# Patient Record
(demographics unavailable — no encounter records)

---

## 2024-10-22 NOTE — PAST MEDICAL HISTORY
[Menarche Age ____] : age at menarche was [unfilled] [History of Hormone Replacement Treatment] : has no history of hormone replacement treatment [Total Preg ___] : G[unfilled] [Live Births ___] : P[unfilled]

## 2024-10-22 NOTE — PHYSICAL EXAM
[No Supraclavicular Adenopathy] : no supraclavicular adenopathy [No Cervical Adenopathy] : no cervical adenopathy [Clear to Auscultation Bilat] : clear to auscultation bilaterally [Examined in the supine and seated position] : examined in the supine and seated position [Asymmetrical] : asymmetrical [No dominant masses] : no dominant masses in right breast  [No dominant masses] : no dominant masses left breast [No Nipple Retraction] : no left nipple retraction [No Nipple Discharge] : no left nipple discharge [No Axillary Lymphadenopathy] : no left axillary lymphadenopathy [No Rashes] : no rashes [de-identified] : Status post right total mastectomy without reconstruction. [de-identified] : Lymphedema of the right arm 1-2+.

## 2024-10-22 NOTE — HISTORY OF PRESENT ILLNESS
[FreeTextEntry1] : 8/24 right total mastectomy with sentinel node biopsy without reconstruction Moderately differentiated invasive ductal carcinoma, 1.7 cm, negative margins, 0/4 nodes, ER/[place] positive HER2 negative with a Ki-67 between 40 and 50% T1 cN0 M0, stage Ia, [place] EX: Low risk luminal  Patient denies any breast masses or bone pain.  She has been to physical therapy for range of motion exercises and its gotten completely better but in the meantime has developed some right arm swelling and is received no treatment for this.  Patient has not seen medical oncology.  76-year-old postmenopausal nulliparous woman went to her annual visit with the primary care physician who felt a right upper outer quadrant breast mass.  Patient had a mammogram which showed a superficial lobulated mass with architectural distortion and associated calcifications measuring 8 mm in the area of the palpable concern with an additional lobulated mass with calcifications in the upper outer quadrant more posteriorly again 8 mm in dimension.  Extending between these 2 masses there is a large area of pleomorphic calcifications expanding an area of approximately 6 cm.  There is nothing abnormal on the left side.  Ultrasound showed at the 10 o'clock position of the right breast 3 cm from the nipple is a 7 mm irregular shaped superficial hypoechoic mass with internal vascularity as well as in the 10 o'clock position, 4 cm from the nipple another 7 mm lobulated hypoechoic mass deeply situated, BI-RADS 4.  No axillary adenopathy was identified, and the left breast was benign.  She denies any nipple discharge or bone pain.  Patient is adopted and does not know her family history and this is her first set of biopsies.  Patient has never taken hormone replacement therapy.

## 2024-10-29 NOTE — ASSESSMENT
[Reviewed updated] : Reviewed updated [Designated Health Care Proxy] : Designated Health Care Proxy [Name: ___] : Name: [unfilled] [Relationship: ___] : Relationship: [unfilled] [Full Code] : full code [FreeTextEntry1] : # Moderately differentiated invasive ductal carcinoma, 1.7 cm, negative margins, 0/4 nodes, ER/[place] positive HER2 negative with a Ki-67 between 40 and 50% T1 cN0 M0, stage Ia, [place] EX: Low risk luminal A s/p right total mastectomy with sentinel node biopsy without reconstruction Reviewed the diagnosis, prognosis and natural history of ER/[place] positive HER2 negative IDC Reviewed the imaging and path Reviewed the NCCN guidelines and patient expresses understanding We did discuss despite her mammaprint I would recommend an aromatase inhibitor, Anastrozole, 1mg PO q day for a minimum of 5 years given that her tumor is ER/KS+ and she is post menopausal. We have reviewed the side effects of Anastrozole to include but are not limited to hot flashes, mood swings, arthralgias, bone pain, thinning of the bones including osteopenia/osteoporosis. She will take this with Ca++ 1200 mg PO q day and Vit D 800 IU daily to protect her bone health.      #osteopenia continue ca++ and vit D continue weight bearing exercise limit alcohol maintain healthy BMI    DEXA 6/24 -  Osteopenia,  Spine, L1 and L2 excluded: T-score: 0.1.  Femoral neck:  T-score: -1.7 . Total hip: T-score: -1.0  #lymphedema - PT ordered  RTC in 3-4 months with cbc with diff, cmp, vit D [AdvancecareDate] : 10/25/24

## 2024-10-29 NOTE — HISTORY OF PRESENT ILLNESS
[de-identified] : Ms. Yesy Cantu is a 76 year old female referred to office by Dr. Brito here for initial consultation for breast cancer. 07/24/24: Mammaprint result low risk 08/01/24 right total mastectomy with sentinel node biopsy without reconstruction Moderately differentiated invasive ductal carcinoma, 1.7 cm, negative margins, 0/4 nodes, ER/[place] positive HER2 negative with a Ki-67 between 40 and 50% T1 cN0 M0, stage Ia, [place] EX: Low risk luminal Patient had a mammogram which showed a superficial lobulated mass with architectural distortion and associated calcifications measuring 8 mm in the area of the palpable concern with an additional lobulated mass with calcifications in the upper outer quadrant more posteriorly again 8 mm in dimension. Extending between these 2 masses there is a large area of pleomorphic calcifications expanding an area of approximately 6 cm. There is nothing abnormal on the left side. Ultrasound showed at the 10 o'clock position of the right breast 3 cm from the nipple is a 7 mm irregular shaped superficial hypoechoic mass with internal vascularity as well as in the 10 o'clock position, 4 cm from the nipple another 7 mm lobulated hypoechoic mass deeply situated, BI-RADS 4. No axillary adenopathy was identified, and the left breast was benign. She denies any nipple discharge or bone pain. Patient is adopted and does not know her family history and this is her first set of biopsies. Patient has never taken hormone replacement therapy.  med hx: HTN, anxiety  Related family history: adopted   Menarche: 13 years, heavy, 2-3 ppd Menopause: 35 G0 Oral Contraceptives: birth control in her 20s for a couple of years Last pap/pelvic exam: 10 years ago Occupation: retired IT supervisor  Smoking: never ETOH: occasionally Illicit drug use: no

## 2024-10-29 NOTE — ASSESSMENT
[Reviewed updated] : Reviewed updated [Designated Health Care Proxy] : Designated Health Care Proxy [Name: ___] : Name: [unfilled] [Relationship: ___] : Relationship: [unfilled] [Full Code] : full code [FreeTextEntry1] : # Moderately differentiated invasive ductal carcinoma, 1.7 cm, negative margins, 0/4 nodes, ER/CA positive HER2 negative with a Ki-67 between 40 and 50% T1 cN0 M0, stage Ia, [place] EX: Low risk luminal A s/p right total mastectomy with sentinel node biopsy without reconstruction Reviewed the diagnosis, prognosis and natural history of ER/CA positive HER2 negative IDC Reviewed the imaging and path Reviewed the NCCN guidelines and patient expresses understanding We did discuss despite her mammaprint I would recommend an aromatase inhibitor, Anastrozole, 1mg PO q day for a minimum of 5 years given that her tumor is ER/CA+ and she is post menopausal. We have reviewed the side effects of Anastrozole to include but are not limited to hot flashes, mood swings, arthralgias, bone pain, thinning of the bones including osteopenia/osteoporosis. She will take this with Ca++ 1200 mg PO q day and Vit D 800 IU daily to protect her bone health.      #osteopenia continue ca++ and vit D continue weight bearing exercise limit alcohol maintain healthy BMI    DEXA 6/24 -  Osteopenia,  Spine, L1 and L2 excluded: T-score: 0.1.  Femoral neck:  T-score: -1.7 . Total hip: T-score: -1.0  #lymphedema - PT ordered  RTC in 3-4 months with cbc with diff, cmp, vit D [AdvancecareDate] : 10/25/24

## 2024-10-29 NOTE — HISTORY OF PRESENT ILLNESS
[de-identified] : Ms. Yesy Cantu is a 76 year old female referred to office by Dr. Brito here for initial consultation for breast cancer. 07/24/24: Mammaprint result low risk 08/01/24 right total mastectomy with sentinel node biopsy without reconstruction Moderately differentiated invasive ductal carcinoma, 1.7 cm, negative margins, 0/4 nodes, ER/[place] positive HER2 negative with a Ki-67 between 40 and 50% T1 cN0 M0, stage Ia, [place] EX: Low risk luminal Patient had a mammogram which showed a superficial lobulated mass with architectural distortion and associated calcifications measuring 8 mm in the area of the palpable concern with an additional lobulated mass with calcifications in the upper outer quadrant more posteriorly again 8 mm in dimension. Extending between these 2 masses there is a large area of pleomorphic calcifications expanding an area of approximately 6 cm. There is nothing abnormal on the left side. Ultrasound showed at the 10 o'clock position of the right breast 3 cm from the nipple is a 7 mm irregular shaped superficial hypoechoic mass with internal vascularity as well as in the 10 o'clock position, 4 cm from the nipple another 7 mm lobulated hypoechoic mass deeply situated, BI-RADS 4. No axillary adenopathy was identified, and the left breast was benign. She denies any nipple discharge or bone pain. Patient is adopted and does not know her family history and this is her first set of biopsies. Patient has never taken hormone replacement therapy.  med hx: HTN, anxiety  Related family history: adopted   Menarche: 13 years, heavy, 2-3 ppd Menopause: 35 G0 Oral Contraceptives: birth control in her 20s for a couple of years Last pap/pelvic exam: 10 years ago Occupation: retired IT supervisor  Smoking: never ETOH: occasionally Illicit drug use: no

## 2024-10-29 NOTE — BEGINNING OF VISIT
[0] : 2) Feeling down, depressed, or hopeless: Not at all (0) [PHQ-2 Negative] : PHQ-2 Negative [Pain Scale: ___] : On a scale of 1-10, today the patient's pain is a(n) [unfilled]. [Never] : Never [Date Discussed (MM/DD/YY): ___] : Discussed: [unfilled] [Reviewed updated] : Reviewed updated [[id number]] : 0

## 2024-10-29 NOTE — HISTORY OF PRESENT ILLNESS
[de-identified] : Ms. Yesy Cantu is a 76 year old female referred to office by Dr. Brito here for initial consultation for breast cancer. 07/24/24: Mammaprint result low risk 08/01/24 right total mastectomy with sentinel node biopsy without reconstruction Moderately differentiated invasive ductal carcinoma, 1.7 cm, negative margins, 0/4 nodes, ER/CA positive HER2 negative with a Ki-67 between 40 and 50% T1 cN0 M0, stage Ia, [place] EX: Low risk luminal Patient had a mammogram which showed a superficial lobulated mass with architectural distortion and associated calcifications measuring 8 mm in the area of the palpable concern with an additional lobulated mass with calcifications in the upper outer quadrant more posteriorly again 8 mm in dimension. Extending between these 2 masses there is a large area of pleomorphic calcifications expanding an area of approximately 6 cm. There is nothing abnormal on the left side. Ultrasound showed at the 10 o'clock position of the right breast 3 cm from the nipple is a 7 mm irregular shaped superficial hypoechoic mass with internal vascularity as well as in the 10 o'clock position, 4 cm from the nipple another 7 mm lobulated hypoechoic mass deeply situated, BI-RADS 4. No axillary adenopathy was identified, and the left breast was benign. She denies any nipple discharge or bone pain. Patient is adopted and does not know her family history and this is her first set of biopsies. Patient has never taken hormone replacement therapy.  med hx: HTN, anxiety  Related family history: adopted   Menarche: 13 years, heavy, 2-3 ppd Menopause: 35 G0 Oral Contraceptives: birth control in her 20s for a couple of years Last pap/pelvic exam: 10 years ago Occupation: retired IT supervisor  Smoking: never ETOH: occasionally Illicit drug use: no

## 2024-10-29 NOTE — HISTORY OF PRESENT ILLNESS
[de-identified] : Ms. Yesy Cantu is a 76 year old female referred to office by Dr. Brito here for initial consultation for breast cancer. 07/24/24: Mammaprint result low risk 08/01/24 right total mastectomy with sentinel node biopsy without reconstruction Moderately differentiated invasive ductal carcinoma, 1.7 cm, negative margins, 0/4 nodes, ER/[place] positive HER2 negative with a Ki-67 between 40 and 50% T1 cN0 M0, stage Ia, [place] EX: Low risk luminal Patient had a mammogram which showed a superficial lobulated mass with architectural distortion and associated calcifications measuring 8 mm in the area of the palpable concern with an additional lobulated mass with calcifications in the upper outer quadrant more posteriorly again 8 mm in dimension. Extending between these 2 masses there is a large area of pleomorphic calcifications expanding an area of approximately 6 cm. There is nothing abnormal on the left side. Ultrasound showed at the 10 o'clock position of the right breast 3 cm from the nipple is a 7 mm irregular shaped superficial hypoechoic mass with internal vascularity as well as in the 10 o'clock position, 4 cm from the nipple another 7 mm lobulated hypoechoic mass deeply situated, BI-RADS 4. No axillary adenopathy was identified, and the left breast was benign. She denies any nipple discharge or bone pain. Patient is adopted and does not know her family history and this is her first set of biopsies. Patient has never taken hormone replacement therapy.  med hx: HTN, anxiety  Related family history: adopted   Menarche: 13 years, heavy, 2-3 ppd Menopause: 35 G0 Oral Contraceptives: birth control in her 20s for a couple of years Last pap/pelvic exam: 10 years ago Occupation: retired IT supervisor  Smoking: never ETOH: occasionally Illicit drug use: no

## 2024-10-29 NOTE — CONSULT LETTER
[Dear  ___] : Dear  [unfilled], [Consult Letter:] : I had the pleasure of evaluating your patient, [unfilled]. [Please see my note below.] : Please see my note below. [Consult Closing:] : Thank you very much for allowing me to participate in the care of this patient.  If you have any questions, please do not hesitate to contact me. [Sincerely,] : Sincerely, [FreeTextEntry3] : Kenya Andrade DO, FACJULIO, FACP Medical Oncology and Hematology [facility]

## 2024-10-29 NOTE — BEGINNING OF VISIT
[0] : 2) Feeling down, depressed, or hopeless: Not at all (0) [PHQ-2 Negative] : PHQ-2 Negative [Pain Scale: ___] : On a scale of 1-10, today the patient's pain is a(n) [unfilled]. [Never] : Never [Date Discussed (MM/DD/YY): ___] : Discussed: [unfilled] [Reviewed updated] : Reviewed updated [[id number]] : 0

## 2024-10-29 NOTE — BEGINNING OF VISIT
[0] : 2) Feeling down, depressed, or hopeless: Not at all (0) [PHQ-2 Negative] : PHQ-2 Negative [Pain Scale: ___] : On a scale of 1-10, today the patient's pain is a(n) [unfilled]. [Never] : Never [Date Discussed (MM/DD/YY): ___] : Discussed: [unfilled] [Reviewed updated] : Reviewed updated [GME2Cdkgd] : 0

## 2024-10-29 NOTE — ASSESSMENT
[Reviewed updated] : Reviewed updated [Designated Health Care Proxy] : Designated Health Care Proxy [Name: ___] : Name: [unfilled] [Relationship: ___] : Relationship: [unfilled] [Full Code] : full code [FreeTextEntry1] : # Moderately differentiated invasive ductal carcinoma, 1.7 cm, negative margins, 0/4 nodes, ER/[place] positive HER2 negative with a Ki-67 between 40 and 50% T1 cN0 M0, stage Ia, [place] EX: Low risk luminal A s/p right total mastectomy with sentinel node biopsy without reconstruction Reviewed the diagnosis, prognosis and natural history of ER/[place] positive HER2 negative IDC Reviewed the imaging and path Reviewed the NCCN guidelines and patient expresses understanding We did discuss despite her mammaprint I would recommend an aromatase inhibitor, Anastrozole, 1mg PO q day for a minimum of 5 years given that her tumor is ER/TX+ and she is post menopausal. We have reviewed the side effects of Anastrozole to include but are not limited to hot flashes, mood swings, arthralgias, bone pain, thinning of the bones including osteopenia/osteoporosis. She will take this with Ca++ 1200 mg PO q day and Vit D 800 IU daily to protect her bone health.      #osteopenia continue ca++ and vit D continue weight bearing exercise limit alcohol maintain healthy BMI    DEXA 6/24 -  Osteopenia,  Spine, L1 and L2 excluded: T-score: 0.1.  Femoral neck:  T-score: -1.7 . Total hip: T-score: -1.0  #lymphedema - PT ordered  RTC in 3-4 months with cbc with diff, cmp, vit D [AdvancecareDate] : 10/25/24

## 2024-10-29 NOTE — CONSULT LETTER
[Dear  ___] : Dear  [unfilled], [Consult Letter:] : I had the pleasure of evaluating your patient, [unfilled]. [Please see my note below.] : Please see my note below. [Consult Closing:] : Thank you very much for allowing me to participate in the care of this patient.  If you have any questions, please do not hesitate to contact me. [Sincerely,] : Sincerely, [FreeTextEntry3] : Kenya Andrade DO, FACJULIO, FACP Medical Oncology and Hematology [facility]

## 2024-10-29 NOTE — CONSULT LETTER
[Dear  ___] : Dear  [unfilled], [Consult Letter:] : I had the pleasure of evaluating your patient, [unfilled]. [Please see my note below.] : Please see my note below. [Consult Closing:] : Thank you very much for allowing me to participate in the care of this patient.  If you have any questions, please do not hesitate to contact me. [Sincerely,] : Sincerely, [FreeTextEntry3] : Kenya Andrade DO, FACJULIO, FACP Medical Oncology and Hematology [facility]

## 2024-10-29 NOTE — BEGINNING OF VISIT
[0] : 2) Feeling down, depressed, or hopeless: Not at all (0) [PHQ-2 Negative] : PHQ-2 Negative [Pain Scale: ___] : On a scale of 1-10, today the patient's pain is a(n) [unfilled]. [Never] : Never [Date Discussed (MM/DD/YY): ___] : Discussed: [unfilled] [Reviewed updated] : Reviewed updated [[id number]] : 0

## 2024-10-29 NOTE — CONSULT LETTER
[Dear  ___] : Dear  [unfilled], [Consult Letter:] : I had the pleasure of evaluating your patient, [unfilled]. [Please see my note below.] : Please see my note below. [Consult Closing:] : Thank you very much for allowing me to participate in the care of this patient.  If you have any questions, please do not hesitate to contact me. [Sincerely,] : Sincerely, [FreeTextEntry3] : Kenya Andrade DO, FACJULIO, FACP Medical Oncology and Hematology [facility]

## 2024-10-29 NOTE — ASSESSMENT
[Reviewed updated] : Reviewed updated [Designated Health Care Proxy] : Designated Health Care Proxy [Name: ___] : Name: [unfilled] [Relationship: ___] : Relationship: [unfilled] [Full Code] : full code [FreeTextEntry1] : # Moderately differentiated invasive ductal carcinoma, 1.7 cm, negative margins, 0/4 nodes, ER/[place] positive HER2 negative with a Ki-67 between 40 and 50% T1 cN0 M0, stage Ia, [place] EX: Low risk luminal A s/p right total mastectomy with sentinel node biopsy without reconstruction Reviewed the diagnosis, prognosis and natural history of ER/[place] positive HER2 negative IDC Reviewed the imaging and path Reviewed the NCCN guidelines and patient expresses understanding We did discuss despite her mammaprint I would recommend an aromatase inhibitor, Anastrozole, 1mg PO q day for a minimum of 5 years given that her tumor is ER/[place]+ and she is post menopausal. We have reviewed the side effects of Anastrozole to include but are not limited to hot flashes, mood swings, arthralgias, bone pain, thinning of the bones including osteopenia/osteoporosis. She will take this with Ca++ 1200 mg PO q day and Vit D 800 IU daily to protect her bone health.      #osteopenia continue ca++ and vit D continue weight bearing exercise limit alcohol maintain healthy BMI    DEXA 6/24 -  Osteopenia,  Spine, L1 and L2 excluded: T-score: 0.1.  Femoral neck:  T-score: -1.7 . Total hip: T-score: -1.0  #lymphedema - PT ordered  RTC in 3-4 months with cbc with diff, cmp, vit D [AdvancecareDate] : 10/25/24

## 2025-04-08 NOTE — PHYSICAL EXAM
[No Supraclavicular Adenopathy] : no supraclavicular adenopathy [No Cervical Adenopathy] : no cervical adenopathy [Clear to Auscultation Bilat] : clear to auscultation bilaterally [Examined in the supine and seated position] : examined in the supine and seated position [Asymmetrical] : asymmetrical [No dominant masses] : no dominant masses in right breast  [No dominant masses] : no dominant masses left breast [No Nipple Retraction] : no left nipple retraction [No Nipple Discharge] : no left nipple discharge [No Axillary Lymphadenopathy] : no left axillary lymphadenopathy [No Rashes] : no rashes [de-identified] : Status post right total mastectomy without reconstruction. [de-identified] : Lymphedema of the right arm 1+ with good range of motion.

## 2025-04-08 NOTE — HISTORY OF PRESENT ILLNESS
[FreeTextEntry1] : 8/24 right total mastectomy with sentinel node biopsy without reconstruction Moderately differentiated invasive ductal carcinoma, 1.7 cm, negative margins, 0/4 nodes, ER/[place] positive HER2 negative with a Ki-67 between 40 and 50% T1 cN0 M0, stage Ia, [place] EX: Low risk luminal  Patient denies any breast masses or bone pain.   76-year-old postmenopausal nulliparous woman went to her annual visit with the primary care physician who felt a right upper outer quadrant breast mass.  Patient had a mammogram which showed a superficial lobulated mass with architectural distortion and associated calcifications measuring 8 mm in the area of the palpable concern with an additional lobulated mass with calcifications in the upper outer quadrant more posteriorly again 8 mm in dimension.  Extending between these 2 masses there is a large area of pleomorphic calcifications expanding an area of approximately 6 cm.  There is nothing abnormal on the left side.  Ultrasound showed at the 10 o'clock position of the right breast 3 cm from the nipple is a 7 mm irregular shaped superficial hypoechoic mass with internal vascularity as well as in the 10 o'clock position, 4 cm from the nipple another 7 mm lobulated hypoechoic mass deeply situated, BI-RADS 4.  No axillary adenopathy was identified, and the left breast was benign.  She denies any nipple discharge or bone pain.  Patient is adopted and does not know her family history and this is her first set of biopsies.  Patient has never taken hormone replacement therapy.

## 2025-04-08 NOTE — PHYSICAL EXAM
[No Supraclavicular Adenopathy] : no supraclavicular adenopathy [No Cervical Adenopathy] : no cervical adenopathy [Clear to Auscultation Bilat] : clear to auscultation bilaterally [Examined in the supine and seated position] : examined in the supine and seated position [Asymmetrical] : asymmetrical [No dominant masses] : no dominant masses in right breast  [No dominant masses] : no dominant masses left breast [No Nipple Retraction] : no left nipple retraction [No Nipple Discharge] : no left nipple discharge [No Axillary Lymphadenopathy] : no left axillary lymphadenopathy [No Rashes] : no rashes [de-identified] : Status post right total mastectomy without reconstruction. [de-identified] : Lymphedema of the right arm 1+ with good range of motion.

## 2025-04-08 NOTE — HISTORY OF PRESENT ILLNESS
[FreeTextEntry1] : 8/24 right total mastectomy with sentinel node biopsy without reconstruction Moderately differentiated invasive ductal carcinoma, 1.7 cm, negative margins, 0/4 nodes, ER/[place] positive HER2 negative with a Ki-67 between 40 and 50% T1 cN0 M0, stage Ia, [place] EX: Low risk luminal  Patient denies any breast masses or bone pain.   76-year-old postmenopausal nulliparous woman went to her annual visit with the primary care physician who felt a right upper outer quadrant breast mass.  Patient had a mammogram which showed a superficial lobulated mass with architectural distortion and associated calcifications measuring 8 mm in the area of the palpable concern with an additional lobulated mass with calcifications in the upper outer quadrant more posteriorly again 8 mm in dimension.  Extending between these 2 masses there is a large area of pleomorphic calcifications expanding an area of approximately 6 cm.  There is nothing abnormal on the left side.  Ultrasound showed at the 10 o'clock position of the right breast 3 cm from the nipple is a 7 mm irregular shaped superficial hypoechoic mass with internal vascularity as well as in the 10 o'clock position, 4 cm from the nipple another 7 mm lobulated hypoechoic mass deeply situated, BI-RADS 4.  No axillary adenopathy was identified, and the left breast was benign.  She denies any nipple discharge or bone pain.  Patient is adopted and does not know her family history and this is her first set of biopsies.  Patient has never taken hormone replacement therapy.

## 2025-04-17 NOTE — BEGINNING OF VISIT
[0] : 2) Feeling down, depressed, or hopeless: Not at all (0) [PHQ-2 Negative] : PHQ-2 Negative [Pain Scale: ___] : On a scale of 1-10, today the patient's pain is a(n) [unfilled]. [Never] : Never [Date Discussed (MM/DD/YY): ___] : Discussed: [unfilled] [Reviewed, no changes] : Reviewed, no changes [[id number]] : 0

## 2025-04-17 NOTE — ASSESSMENT
[Reviewed updated] : Reviewed updated [Designated Health Care Proxy] : Designated Health Care Proxy [Name: ___] : Name: [unfilled] [Relationship: ___] : Relationship: [unfilled] [Full Code] : full code [Reviewed no changes] : Reviewed no changes [FreeTextEntry1] : # Moderately differentiated invasive ductal carcinoma, 1.7 cm, negative margins, 0/4 nodes, ER/[place] positive HER2 negative with a Ki-67 between 40 and 50% T1 cN0 M0, stage Ia, [place] EX: Low risk luminal A s/p right total mastectomy with sentinel node biopsy without reconstruction continue anastrozole, ca++ and vit D. took long break follows with Dr. Linder - 4/8/25 Left mammogram in July.   #osteopenia continue ca++ and vit D continue weight bearing exercise limit alcohol maintain healthy BMI    DEXA 6/24 -  Osteopenia,  Spine, L1 and L2 excluded: T-score: 0.1.  Femoral neck:  T-score: -1.7 . Total hip: T-score: -1.0  #lymphedema - PT ordered  RTC in 4-6 months with cbc with diff, cmp, vit D [AdvancecareDate] : 04/17/25

## 2025-04-17 NOTE — HISTORY OF PRESENT ILLNESS
[de-identified] : Ms. Yesy Cantu is a 76 year old female referred to office by Dr. Brito here for initial consultation for breast cancer. 07/24/24: Mammaprint result low risk 08/01/24 right total mastectomy with sentinel node biopsy without reconstruction Moderately differentiated invasive ductal carcinoma, 1.7 cm, negative margins, 0/4 nodes, ER/[place] positive HER2 negative with a Ki-67 between 40 and 50% T1 cN0 M0, stage Ia, [place] EX: Low risk luminal Patient had a mammogram which showed a superficial lobulated mass with architectural distortion and associated calcifications measuring 8 mm in the area of the palpable concern with an additional lobulated mass with calcifications in the upper outer quadrant more posteriorly again 8 mm in dimension. Extending between these 2 masses there is a large area of pleomorphic calcifications expanding an area of approximately 6 cm. There is nothing abnormal on the left side. Ultrasound showed at the 10 o'clock position of the right breast 3 cm from the nipple is a 7 mm irregular shaped superficial hypoechoic mass with internal vascularity as well as in the 10 o'clock position, 4 cm from the nipple another 7 mm lobulated hypoechoic mass deeply situated, BI-RADS 4. No axillary adenopathy was identified, and the left breast was benign. She denies any nipple discharge or bone pain. Patient is adopted and does not know her family history and this is her first set of biopsies. Patient has never taken hormone replacement therapy.  med hx: HTN, anxiety  Related family history: adopted   Menarche: 13 years, heavy, 2-3 ppd Menopause: 35 G0 Oral Contraceptives: birth control in her 20s for a couple of years Last pap/pelvic exam: 10 years ago Occupation: retired IT supervisor  Smoking: never ETOH: occasionally Illicit drug use: no  [de-identified] : Pt here for follow up Overall feels well On anastrozole currently, tolerating well but stopped for a while

## 2025-04-17 NOTE — CONSULT LETTER
[Dear  ___] : Dear  [unfilled], [Consult Letter:] : I had the pleasure of evaluating your patient, [unfilled]. [Please see my note below.] : Please see my note below. [Consult Closing:] : Thank you very much for allowing me to participate in the care of this patient.  If you have any questions, please do not hesitate to contact me. [Sincerely,] : Sincerely, [FreeTextEntry3] : Kenya Andrade DO, FACJULIO, FACP Medical Oncology and Hematology [facility]

## 2025-04-17 NOTE — BEGINNING OF VISIT
[0] : 2) Feeling down, depressed, or hopeless: Not at all (0) [PHQ-2 Negative] : PHQ-2 Negative [Pain Scale: ___] : On a scale of 1-10, today the patient's pain is a(n) [unfilled]. [Never] : Never [Date Discussed (MM/DD/YY): ___] : Discussed: [unfilled] [Reviewed, no changes] : Reviewed, no changes [[id number]] : 0

## 2025-04-17 NOTE — HISTORY OF PRESENT ILLNESS
[de-identified] : Ms. Yesy Cantu is a 76 year old female referred to office by Dr. Brito here for initial consultation for breast cancer. 07/24/24: Mammaprint result low risk 08/01/24 right total mastectomy with sentinel node biopsy without reconstruction Moderately differentiated invasive ductal carcinoma, 1.7 cm, negative margins, 0/4 nodes, ER/[place] positive HER2 negative with a Ki-67 between 40 and 50% T1 cN0 M0, stage Ia, [place] EX: Low risk luminal Patient had a mammogram which showed a superficial lobulated mass with architectural distortion and associated calcifications measuring 8 mm in the area of the palpable concern with an additional lobulated mass with calcifications in the upper outer quadrant more posteriorly again 8 mm in dimension. Extending between these 2 masses there is a large area of pleomorphic calcifications expanding an area of approximately 6 cm. There is nothing abnormal on the left side. Ultrasound showed at the 10 o'clock position of the right breast 3 cm from the nipple is a 7 mm irregular shaped superficial hypoechoic mass with internal vascularity as well as in the 10 o'clock position, 4 cm from the nipple another 7 mm lobulated hypoechoic mass deeply situated, BI-RADS 4. No axillary adenopathy was identified, and the left breast was benign. She denies any nipple discharge or bone pain. Patient is adopted and does not know her family history and this is her first set of biopsies. Patient has never taken hormone replacement therapy.  med hx: HTN, anxiety  Related family history: adopted   Menarche: 13 years, heavy, 2-3 ppd Menopause: 35 G0 Oral Contraceptives: birth control in her 20s for a couple of years Last pap/pelvic exam: 10 years ago Occupation: retired IT supervisor  Smoking: never ETOH: occasionally Illicit drug use: no  [de-identified] : Pt here for follow up Overall feels well On anastrozole currently, tolerating well but stopped for a while

## 2025-04-17 NOTE — HISTORY OF PRESENT ILLNESS
[de-identified] : Ms. Yesy Cantu is a 76 year old female referred to office by Dr. Brito here for initial consultation for breast cancer. 07/24/24: Mammaprint result low risk 08/01/24 right total mastectomy with sentinel node biopsy without reconstruction Moderately differentiated invasive ductal carcinoma, 1.7 cm, negative margins, 0/4 nodes, ER/[place] positive HER2 negative with a Ki-67 between 40 and 50% T1 cN0 M0, stage Ia, [place] EX: Low risk luminal Patient had a mammogram which showed a superficial lobulated mass with architectural distortion and associated calcifications measuring 8 mm in the area of the palpable concern with an additional lobulated mass with calcifications in the upper outer quadrant more posteriorly again 8 mm in dimension. Extending between these 2 masses there is a large area of pleomorphic calcifications expanding an area of approximately 6 cm. There is nothing abnormal on the left side. Ultrasound showed at the 10 o'clock position of the right breast 3 cm from the nipple is a 7 mm irregular shaped superficial hypoechoic mass with internal vascularity as well as in the 10 o'clock position, 4 cm from the nipple another 7 mm lobulated hypoechoic mass deeply situated, BI-RADS 4. No axillary adenopathy was identified, and the left breast was benign. She denies any nipple discharge or bone pain. Patient is adopted and does not know her family history and this is her first set of biopsies. Patient has never taken hormone replacement therapy.  med hx: HTN, anxiety  Related family history: adopted   Menarche: 13 years, heavy, 2-3 ppd Menopause: 35 G0 Oral Contraceptives: birth control in her 20s for a couple of years Last pap/pelvic exam: 10 years ago Occupation: retired IT supervisor  Smoking: never ETOH: occasionally Illicit drug use: no  [de-identified] : Pt here for follow up Overall feels well On anastrozole currently, tolerating well but stopped for a while

## 2025-04-17 NOTE — HISTORY OF PRESENT ILLNESS
[de-identified] : Ms. Yesy Cantu is a 76 year old female referred to office by Dr. Brito here for initial consultation for breast cancer. 07/24/24: Mammaprint result low risk 08/01/24 right total mastectomy with sentinel node biopsy without reconstruction Moderately differentiated invasive ductal carcinoma, 1.7 cm, negative margins, 0/4 nodes, ER/[place] positive HER2 negative with a Ki-67 between 40 and 50% T1 cN0 M0, stage Ia, [place] EX: Low risk luminal Patient had a mammogram which showed a superficial lobulated mass with architectural distortion and associated calcifications measuring 8 mm in the area of the palpable concern with an additional lobulated mass with calcifications in the upper outer quadrant more posteriorly again 8 mm in dimension. Extending between these 2 masses there is a large area of pleomorphic calcifications expanding an area of approximately 6 cm. There is nothing abnormal on the left side. Ultrasound showed at the 10 o'clock position of the right breast 3 cm from the nipple is a 7 mm irregular shaped superficial hypoechoic mass with internal vascularity as well as in the 10 o'clock position, 4 cm from the nipple another 7 mm lobulated hypoechoic mass deeply situated, BI-RADS 4. No axillary adenopathy was identified, and the left breast was benign. She denies any nipple discharge or bone pain. Patient is adopted and does not know her family history and this is her first set of biopsies. Patient has never taken hormone replacement therapy.  med hx: HTN, anxiety  Related family history: adopted   Menarche: 13 years, heavy, 2-3 ppd Menopause: 35 G0 Oral Contraceptives: birth control in her 20s for a couple of years Last pap/pelvic exam: 10 years ago Occupation: retired IT supervisor  Smoking: never ETOH: occasionally Illicit drug use: no  [de-identified] : Pt here for follow up Overall feels well On anastrozole currently, tolerating well but stopped for a while

## 2025-04-17 NOTE — ASSESSMENT
[Reviewed updated] : Reviewed updated [Designated Health Care Proxy] : Designated Health Care Proxy [Name: ___] : Name: [unfilled] [Relationship: ___] : Relationship: [unfilled] [Full Code] : full code [Reviewed no changes] : Reviewed no changes [FreeTextEntry1] : # Moderately differentiated invasive ductal carcinoma, 1.7 cm, negative margins, 0/4 nodes, ER/[place] positive HER2 negative with a Ki-67 between 40 and 50% T1 cN0 M0, stage Ia, [place] EX: Low risk luminal A s/p right total mastectomy with sentinel node biopsy without reconstruction continue anastrozole, ca++ and vit D. took long break follows with Dr. Linder - 4/8/25 Left mammogram in July.   #osteopenia continue ca++ and vit D continue weight bearing exercise limit alcohol maintain healthy BMI    DEXA 6/24 -  Osteopenia,  Spine, L1 and L2 excluded: T-score: 0.1.  Femoral neck:  T-score: -1.7 . Total hip: T-score: -1.0  #lymphedema - PT ordered  RTC in 4-6 months with cbc with diff, cmp, vit D [AdvancecareDate] : 04/17/25

## 2025-04-17 NOTE — CONSULT LETTER
[Dear  ___] : Dear  [unfilled], [Consult Letter:] : I had the pleasure of evaluating your patient, [unfilled]. [Please see my note below.] : Please see my note below. [Consult Closing:] : Thank you very much for allowing me to participate in the care of this patient.  If you have any questions, please do not hesitate to contact me. [Sincerely,] : Sincerely, [FreeTextEntry3] : Kenya Andrade DO, FACJULIO, FACP Medical Oncology and Hematology [facility]

## 2025-04-17 NOTE — BEGINNING OF VISIT
[0] : 2) Feeling down, depressed, or hopeless: Not at all (0) [PHQ-2 Negative] : PHQ-2 Negative [Pain Scale: ___] : On a scale of 1-10, today the patient's pain is a(n) [unfilled]. [Never] : Never [Date Discussed (MM/DD/YY): ___] : Discussed: [unfilled] [Reviewed, no changes] : Reviewed, no changes [[id number]] : 0

## 2025-04-17 NOTE — ASSESSMENT
[Reviewed updated] : Reviewed updated [Designated Health Care Proxy] : Designated Health Care Proxy [Name: ___] : Name: [unfilled] [Relationship: ___] : Relationship: [unfilled] [Full Code] : full code [Reviewed no changes] : Reviewed no changes [FreeTextEntry1] : # Moderately differentiated invasive ductal carcinoma, 1.7 cm, negative margins, 0/4 nodes, ER/[place] positive HER2 negative with a Ki-67 between 40 and 50% T1 cN0 M0, stage Ia, [place] EX: Low risk luminal A s/p right total mastectomy with sentinel node biopsy without reconstruction continue anastrozole, ca++ and vit D. took long break follows with Dr. Linder - 4/8/25 Left mammogram in July.   #osteopenia continue ca++ and vit D continue weight bearing exercise limit alcohol maintain healthy BMI    DEXA 6/24 -  Osteopenia,  Spine, L1 and L2 excluded: T-score: 0.1.  Femoral neck:  T-score: -1.7 . Total hip: T-score: -1.0  #lymphedema - PT ordered  RTC in 4-6 months with cbc with diff, cmp, vit D [AdvancecareDate] : 04/17/25

## 2025-04-17 NOTE — BEGINNING OF VISIT
[0] : 2) Feeling down, depressed, or hopeless: Not at all (0) [PHQ-2 Negative] : PHQ-2 Negative [Pain Scale: ___] : On a scale of 1-10, today the patient's pain is a(n) [unfilled]. [Never] : Never [Date Discussed (MM/DD/YY): ___] : Discussed: [unfilled] [Reviewed, no changes] : Reviewed, no changes [[id number]] : 0

## 2025-04-17 NOTE — CONSULT LETTER
[Dear  ___] : Dear  [unfilled], [Consult Letter:] : I had the pleasure of evaluating your patient, [unfilled]. [Please see my note below.] : Please see my note below. [Consult Closing:] : Thank you very much for allowing me to participate in the care of this patient.  If you have any questions, please do not hesitate to contact me. [Sincerely,] : Sincerely, [FreeTextEntry3] : Kenya Andrade DO, FACJULIO, FACP Medical Oncology and Hematology [facility]

## 2025-04-17 NOTE — CONSULT LETTER
[Dear  ___] : Dear  [unfilled], [Consult Letter:] : I had the pleasure of evaluating your patient, [unfilled]. [Please see my note below.] : Please see my note below. [Consult Closing:] : Thank you very much for allowing me to participate in the care of this patient.  If you have any questions, please do not hesitate to contact me. [Sincerely,] : Sincerely, [FreeTextEntry3] : Kenya Andrade DO, FACJULIO, FACP Medical Oncology and Hematology [facility]

## 2025-04-17 NOTE — ASSESSMENT
[Reviewed updated] : Reviewed updated [Designated Health Care Proxy] : Designated Health Care Proxy [Name: ___] : Name: [unfilled] [Relationship: ___] : Relationship: [unfilled] [Full Code] : full code [Reviewed no changes] : Reviewed no changes [FreeTextEntry1] : # Moderately differentiated invasive ductal carcinoma, 1.7 cm, negative margins, 0/4 nodes, ER/CT positive HER2 negative with a Ki-67 between 40 and 50% T1 cN0 M0, stage Ia, [place] EX: Low risk luminal A s/p right total mastectomy with sentinel node biopsy without reconstruction continue anastrozole, ca++ and vit D. took long break follows with Dr. Linder - 4/8/25 Left mammogram in July.   #osteopenia continue ca++ and vit D continue weight bearing exercise limit alcohol maintain healthy BMI    DEXA 6/24 -  Osteopenia,  Spine, L1 and L2 excluded: T-score: 0.1.  Femoral neck:  T-score: -1.7 . Total hip: T-score: -1.0  #lymphedema - PT ordered  RTC in 4-6 months with cbc with diff, cmp, vit D [AdvancecareDate] : 04/17/25
